# Patient Record
Sex: MALE | Race: WHITE | URBAN - METROPOLITAN AREA
[De-identification: names, ages, dates, MRNs, and addresses within clinical notes are randomized per-mention and may not be internally consistent; named-entity substitution may affect disease eponyms.]

---

## 2023-03-15 ENCOUNTER — OFFICE VISIT (OUTPATIENT)
Dept: URBAN - METROPOLITAN AREA CLINIC 115 | Facility: CLINIC | Age: 83
End: 2023-03-15
Payer: MEDICARE

## 2023-03-15 ENCOUNTER — WEB ENCOUNTER (OUTPATIENT)
Dept: URBAN - METROPOLITAN AREA CLINIC 115 | Facility: CLINIC | Age: 83
End: 2023-03-15

## 2023-03-15 VITALS
BODY MASS INDEX: 33.38 KG/M2 | WEIGHT: 170 LBS | HEART RATE: 69 BPM | SYSTOLIC BLOOD PRESSURE: 105 MMHG | HEIGHT: 60 IN | TEMPERATURE: 98 F | DIASTOLIC BLOOD PRESSURE: 64 MMHG

## 2023-03-15 DIAGNOSIS — D64.9 ANEMIA, UNSPECIFIED TYPE: ICD-10-CM

## 2023-03-15 DIAGNOSIS — R79.89 ABNORMAL LFTS: ICD-10-CM

## 2023-03-15 PROBLEM — 166603001: Status: ACTIVE | Noted: 2023-03-15

## 2023-03-15 PROCEDURE — 99204 OFFICE O/P NEW MOD 45 MIN: CPT | Performed by: INTERNAL MEDICINE

## 2023-03-15 NOTE — HPI-TODAY'S VISIT:
Mr. Barbour is a 82-year-old male came into the office for evaluation of having abnormal liver enzymes as well as anemia.  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  Around that time he had labs done that showed mild anemia as well as abnormal liver enzymes.  He was recommended to see hematologist and work-up was unremarkable except for mild anemia.  He was recommended to see GI for that.  Patient reports taking NSAIDs off and on.  Patient has not had any prior colonoscopy evaluation.  He was told to have abnormal liver enzymes however most recent LFTs were told to be unremarkable.  Recent MRI and ultrasound results were all reviewed that showed fatty liver.  Patient patient drinks socially denies any prior history of illicit drug abuse.  He does not recall having any work-up for the liver.

## 2023-05-16 ENCOUNTER — TELEPHONE ENCOUNTER (OUTPATIENT)
Dept: URBAN - METROPOLITAN AREA CLINIC 115 | Facility: CLINIC | Age: 83
End: 2023-05-16

## 2023-05-31 ENCOUNTER — OFFICE VISIT (OUTPATIENT)
Dept: URBAN - METROPOLITAN AREA CLINIC 115 | Facility: CLINIC | Age: 83
End: 2023-05-31
Payer: MEDICARE

## 2023-05-31 ENCOUNTER — DASHBOARD ENCOUNTERS (OUTPATIENT)
Age: 83
End: 2023-05-31

## 2023-05-31 VITALS
BODY MASS INDEX: 33.96 KG/M2 | SYSTOLIC BLOOD PRESSURE: 123 MMHG | HEIGHT: 60 IN | HEART RATE: 61 BPM | TEMPERATURE: 97.5 F | DIASTOLIC BLOOD PRESSURE: 82 MMHG | WEIGHT: 173 LBS

## 2023-05-31 DIAGNOSIS — D50.8 ACQUIRED IRON DEFICIENCY ANEMIA DUE TO DECREASED ABSORPTION: ICD-10-CM

## 2023-05-31 DIAGNOSIS — K76.89 LIVER CYST: ICD-10-CM

## 2023-05-31 DIAGNOSIS — D13.5 ADENOMYOMA, GALLBLADDER: ICD-10-CM

## 2023-05-31 PROBLEM — 73479003: Status: ACTIVE | Noted: 2023-05-31

## 2023-05-31 PROBLEM — 85057007: Status: ACTIVE | Noted: 2023-05-31

## 2023-05-31 PROBLEM — 271737000: Status: ACTIVE | Noted: 2023-05-31

## 2023-05-31 PROCEDURE — 99214 OFFICE O/P EST MOD 30 MIN: CPT | Performed by: INTERNAL MEDICINE

## 2023-05-31 RX ORDER — CARVEDILOL 25 MG/1
1 TABLET WITH FOOD TABLET, FILM COATED ORAL TWICE A DAY
Status: ACTIVE | COMMUNITY

## 2023-05-31 RX ORDER — FUROSEMIDE 20 MG/1
1 TABLET TABLET ORAL ONCE A DAY
Status: ACTIVE | COMMUNITY

## 2023-05-31 RX ORDER — ATORVASTATIN CALCIUM 80 MG/1
1 TABLET TABLET, FILM COATED ORAL ONCE A DAY
Status: ACTIVE | COMMUNITY

## 2023-05-31 NOTE — HPI-TODAY'S VISIT:
Mr. Barbour is a 82-year-old male came into the office for evaluation of having abnormal liver enzymes as well as anemia.  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  Around that time he had labs done that showed mild anemia as well as abnormal liver enzymes.  He was recommended to see hematologist and work-up was unremarkable except for mild anemia.  He was recommended to see GI for that.  Patient reports taking NSAIDs off and on.  Patient has not had any prior colonoscopy evaluation.  He was told to have abnormal liver enzymes however most recent LFTs were told to be unremarkable.  Recent MRI and ultrasound results were all reviewed that showed fatty liver.  Patient patient drinks socially denies any prior history of illicit drug abuse.  He does not recall having any work-up for the liver.  5/31/23 : 82-year-old male was seen today for follow-up and regarding to the abnormal liver enzymes.  Patient was seen last in for similar complaints at that time his AST and ALT was 38/25 but most recent LFTs shows alk phos of 110 and AST of 70 and ALT of 105.  Patient had been on atorvastatin to 80 mg.  Patient was advised to hold off on atorvastatin.  He also had a ultrasound of the abdomen in the past that showed renal cyst and liver cyst.  Most recently he had an MRI of the liver that showed again cystic masses in the liver but otherwise no obvious bile ductal abnormalities noted.  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  He denies any right upper quadrant abdominal pain his current medications were all reviewed.  Patient denies taking any other over-the-counter NSAIDs.  He denies any alcohol use.  Patient denies any right upper quadrant pain.

## 2023-06-05 LAB
ALBUMIN/GLOBULIN RATIO: 1.6
ALBUMIN: 4
ALBUMIN: 4
ALKALINE PHOSPHATASE: 110
ALT (SGPT): 25
ALT: 25
ANA SCREEN, IFA: NEGATIVE
AST (SGOT): 21
AST: 21
BILIRUBIN, DIRECT: 0.3
BILIRUBIN, INDIRECT: 1
BILIRUBIN, TOTAL: 1.3
CALCULATED BMI: 25.5
DIABETES: NO
GLOBULIN: 2.5
GLUCOSE, SERUM: 84
HEIGHT FEET: 9
HEPATITIS A AB, TOTAL: (no result)
HEPATITIS B SURFACE AB IMMUNITY, QN: <5
HEPATITIS B SURFACE ANTIGEN: (no result)
HEPATITIS C ANTIBODY: (no result)
INDEX: 0.14
INTERPRETATION: (no result)
MITOCHONDRIAL (M2) ANTIBODY: <=20
NAFLD FIBROSIS SCORE: -0.17
PLATELET COUNT: 169
PROTEIN, TOTAL: 6.5
RHEUMATOID FACTOR: <14
SJOGREN'S ANTIBODY (SS-A): (no result)
SJOGREN'S ANTIBODY (SS-B): (no result)
WEIGHT: 173

## 2024-05-23 NOTE — PHYSICAL EXAM HENT:
Head, normocephalic, atraumatic, Face, Face within normal limits, Ears, External ears within normal limits Duration Of Freeze Thaw-Cycle (Seconds): 0 Number Of Freeze-Thaw Cycles: 2 freeze-thaw cycles Show Aperture Variable?: Yes Consent: The patient's consent was obtained including but not limited to risks of crusting, scabbing, blistering, scarring, darker or lighter pigmentary change, recurrence, incomplete removal and infection. Post-Care Instructions: I reviewed with the patient in detail post-care instructions. Patient is to wear sunprotection, and avoid picking at any of the treated lesions. Pt may apply Vaseline to crusted or scabbing areas. Detail Level: Detailed Render Note In Bullet Format When Appropriate: No

## 2024-12-18 ENCOUNTER — LAB OUTSIDE AN ENCOUNTER (OUTPATIENT)
Dept: URBAN - METROPOLITAN AREA CLINIC 115 | Facility: CLINIC | Age: 84
End: 2024-12-18

## 2024-12-18 ENCOUNTER — OFFICE VISIT (OUTPATIENT)
Dept: URBAN - METROPOLITAN AREA CLINIC 115 | Facility: CLINIC | Age: 84
End: 2024-12-18
Payer: MEDICARE

## 2024-12-18 VITALS
SYSTOLIC BLOOD PRESSURE: 89 MMHG | WEIGHT: 174 LBS | HEIGHT: 60 IN | BODY MASS INDEX: 34.16 KG/M2 | HEART RATE: 90 BPM | DIASTOLIC BLOOD PRESSURE: 61 MMHG | TEMPERATURE: 97.3 F

## 2024-12-18 DIAGNOSIS — D13.5 ADENOMYOMA, GALLBLADDER: ICD-10-CM

## 2024-12-18 DIAGNOSIS — D64.9 ANEMIA, UNSPECIFIED TYPE: ICD-10-CM

## 2024-12-18 DIAGNOSIS — R79.89 ABNORMAL LFTS: ICD-10-CM

## 2024-12-18 DIAGNOSIS — K59.09 OTHER CONSTIPATION: ICD-10-CM

## 2024-12-18 DIAGNOSIS — K76.89 LIVER CYST: ICD-10-CM

## 2024-12-18 PROCEDURE — 99214 OFFICE O/P EST MOD 30 MIN: CPT | Performed by: INTERNAL MEDICINE

## 2024-12-18 RX ORDER — FUROSEMIDE 20 MG/1
1 TABLET TABLET ORAL ONCE A DAY
Status: ACTIVE | COMMUNITY

## 2024-12-18 RX ORDER — ATORVASTATIN CALCIUM 80 MG/1
1 TABLET TABLET, FILM COATED ORAL ONCE A DAY
Status: ACTIVE | COMMUNITY

## 2024-12-18 RX ORDER — CARVEDILOL 25 MG/1
1 TABLET WITH FOOD TABLET, FILM COATED ORAL TWICE A DAY
Status: ACTIVE | COMMUNITY

## 2024-12-18 NOTE — HPI-TODAY'S VISIT:
Mr. Barbour is a 82-year-old male came into the office for evaluation of having abnormal liver enzymes as well as anemia.  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  Around that time he had labs done that showed mild anemia as well as abnormal liver enzymes.  He was recommended to see hematologist and work-up was unremarkable except for mild anemia.  He was recommended to see GI for that.  Patient reports taking NSAIDs off and on.  Patient has not had any prior colonoscopy evaluation.  He was told to have abnormal liver enzymes however most recent LFTs were told to be unremarkable.  Recent MRI and ultrasound results were all reviewed that showed fatty liver.  Patient patient drinks socially denies any prior history of illicit drug abuse.  He does not recall having any work-up for the liver.  5/31/23 : 82-year-old male was seen today for follow-up and regarding to the abnormal liver enzymes.  Patient was seen last in for similar complaints at that time his AST and ALT was 38/25 but most recent LFTs shows alk phos of 110 and AST of 70 and ALT of 105.  Patient had been on atorvastatin to 80 mg.  Patient was advised to hold off on atorvastatin.  He also had a ultrasound of the abdomen in the past that showed renal cyst and liver cyst.  Most recently he had an MRI of the liver that showed again cystic masses in the liver but otherwise no obvious bile ductal abnormalities noted.  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  He denies any right upper quadrant abdominal pain his current medications were all reviewed.  Patient denies taking any other over-the-counter NSAIDs.  He denies any alcohol use.  Patient denies any right upper quadrant pain.  12/18/24 : Patient came to the office for evaluation of having constipation which has been going on.  Patient reports worsening of constipation since he has not been as active since he needed placement to his having difficulty balancing and hence he has been using a walker.  Patient denies any rectal bleeding however he reports having left lower quadrant abdominal pain and discomfort.  He was also noted to have mild anemia in the past and has had declined to have a colonoscopy done.  Patient is concerned about ongoing worsening of Constipation.  Denies any fever chills unintentional weight loss.

## 2024-12-19 LAB
ABSOLUTE BASOPHILS: 23
ABSOLUTE EOSINOPHILS: 120
ABSOLUTE LYMPHOCYTES: 1744
ABSOLUTE MONOCYTES: 616
ABSOLUTE NEUTROPHILS: 3198
ALBUMIN/GLOBULIN RATIO: 1.8
ALBUMIN: 4.2
ALKALINE PHOSPHATASE: 94
ALT: 29
AST: 29
BASOPHILS: 0.4
BILIRUBIN, TOTAL: 2.1
BUN/CREATININE RATIO: (no result)
CALCIUM: 9.3
CARBON DIOXIDE: 26
CHLORIDE: 107
CREATININE: 0.83
EGFR: 86
EOSINOPHILS: 2.1
FERRITIN, SERUM: 117
FIB 4 INDEX: 3.37
GLOBULIN: 2.3
GLUCOSE: 74
HEMATOCRIT: 48
HEMOGLOBIN: 15.8
LYMPHOCYTES: 30.6
MCH: 30.4
MCHC: 32.9
MCV: 92.3
MONOCYTES: 10.8
MPV: 11.7
NEUTROPHILS: 56.1
PLATELET COUNT: 136
PLATELET COUNT: 136
POTASSIUM: 4.8
PROTEIN, TOTAL: 6.5
RDW: 12.5
RED BLOOD CELL COUNT: 5.2
SODIUM: 141
UREA NITROGEN (BUN): 19
WHITE BLOOD CELL COUNT: 5.7

## 2025-08-27 ENCOUNTER — OFFICE VISIT (OUTPATIENT)
Dept: URBAN - METROPOLITAN AREA CLINIC 115 | Facility: CLINIC | Age: 85
End: 2025-08-27

## 2025-08-27 RX ORDER — ATORVASTATIN CALCIUM 80 MG/1
1 TABLET TABLET, FILM COATED ORAL ONCE A DAY
COMMUNITY

## 2025-08-27 RX ORDER — FUROSEMIDE 20 MG/1
1 TABLET TABLET ORAL ONCE A DAY
COMMUNITY

## 2025-08-27 RX ORDER — CARVEDILOL 25 MG/1
1 TABLET WITH FOOD TABLET, FILM COATED ORAL TWICE A DAY
COMMUNITY